# Patient Record
Sex: MALE | Race: WHITE
[De-identification: names, ages, dates, MRNs, and addresses within clinical notes are randomized per-mention and may not be internally consistent; named-entity substitution may affect disease eponyms.]

---
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
## 2021-06-26 ENCOUNTER — HOSPITAL ENCOUNTER (EMERGENCY)
Dept: HOSPITAL 38 - CC.ED | Age: 86
Discharge: HOME | End: 2021-06-26
Payer: MEDICARE

## 2021-06-26 DIAGNOSIS — Z96.649: ICD-10-CM

## 2021-06-26 DIAGNOSIS — I10: ICD-10-CM

## 2021-06-26 DIAGNOSIS — Z79.899: ICD-10-CM

## 2021-06-26 DIAGNOSIS — R10.11: Primary | ICD-10-CM

## 2021-06-26 DIAGNOSIS — Z79.82: ICD-10-CM

## 2021-06-26 LAB
CHLORIDE SERPL-SCNC: 97 MEQ/L (ref 98–106)
SODIUM SERPL-SCNC: 137 MEQ/L (ref 136–145)

## 2021-06-26 PROCEDURE — 99284 EMERGENCY DEPT VISIT MOD MDM: CPT

## 2021-06-26 PROCEDURE — 36415 COLL VENOUS BLD VENIPUNCTURE: CPT

## 2021-06-26 PROCEDURE — 74177 CT ABD & PELVIS W/CONTRAST: CPT

## 2021-06-26 PROCEDURE — 80053 COMPREHEN METABOLIC PANEL: CPT

## 2021-06-26 PROCEDURE — 81001 URINALYSIS AUTO W/SCOPE: CPT

## 2021-06-26 PROCEDURE — 85025 COMPLETE CBC W/AUTO DIFF WBC: CPT

## 2021-06-26 PROCEDURE — 86140 C-REACTIVE PROTEIN: CPT

## 2021-06-26 PROCEDURE — 82150 ASSAY OF AMYLASE: CPT

## 2021-06-26 PROCEDURE — 83690 ASSAY OF LIPASE: CPT

## 2021-06-26 PROCEDURE — 87086 URINE CULTURE/COLONY COUNT: CPT

## 2021-06-26 PROCEDURE — 87186 SC STD MICRODIL/AGAR DIL: CPT

## 2021-06-26 PROCEDURE — 87088 URINE BACTERIA CULTURE: CPT
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
## 2021-07-01 ENCOUNTER — HOSPITAL ENCOUNTER (EMERGENCY)
Dept: HOSPITAL 38 - CC.ED | Age: 86
Discharge: HOME | End: 2021-07-01
Payer: MEDICARE

## 2021-07-01 DIAGNOSIS — R10.11: Primary | ICD-10-CM

## 2021-07-01 DIAGNOSIS — I10: ICD-10-CM

## 2021-07-01 DIAGNOSIS — Z79.82: ICD-10-CM

## 2021-07-01 DIAGNOSIS — Z90.49: ICD-10-CM

## 2021-07-01 DIAGNOSIS — Z79.899: ICD-10-CM

## 2021-07-01 DIAGNOSIS — E78.00: ICD-10-CM

## 2021-07-01 LAB
CHLORIDE SERPL-SCNC: 100 MEQ/L (ref 98–106)
SODIUM SERPL-SCNC: 136 MEQ/L (ref 136–145)

## 2021-07-01 RX ADMIN — ALUMINUM HYDROXIDE, MAGNESIUM HYDROXIDE, AND SIMETHICONE ONE ML: 200; 200; 20 SUSPENSION ORAL at 22:19

## 2021-07-01 RX ADMIN — ALUMINUM HYDROXIDE, MAGNESIUM HYDROXIDE, AND SIMETHICONE ONE ML: 200; 200; 20 SUSPENSION ORAL at 21:15
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
## 2021-07-08 ENCOUNTER — HOSPITAL ENCOUNTER (EMERGENCY)
Dept: HOSPITAL 38 - CC.ED | Age: 86
Discharge: HOME | End: 2021-07-08
Payer: MEDICARE

## 2021-07-08 DIAGNOSIS — Z79.899: ICD-10-CM

## 2021-07-08 DIAGNOSIS — H00.014: Primary | ICD-10-CM

## 2021-07-08 DIAGNOSIS — E78.00: ICD-10-CM

## 2021-07-08 DIAGNOSIS — Z79.82: ICD-10-CM

## 2021-07-08 DIAGNOSIS — I10: ICD-10-CM

## 2021-07-08 DIAGNOSIS — K21.9: ICD-10-CM
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
## 2021-07-15 ENCOUNTER — HOSPITAL ENCOUNTER (OUTPATIENT)
Dept: HOSPITAL 38 - CC.SDS | Age: 86
End: 2021-07-15
Payer: MEDICARE

## 2021-07-15 DIAGNOSIS — E55.9: ICD-10-CM

## 2021-07-15 DIAGNOSIS — N40.1: ICD-10-CM

## 2021-07-15 DIAGNOSIS — R63.0: ICD-10-CM

## 2021-07-15 DIAGNOSIS — Z98.890: ICD-10-CM

## 2021-07-15 DIAGNOSIS — I10: ICD-10-CM

## 2021-07-15 DIAGNOSIS — R35.1: ICD-10-CM

## 2021-07-15 DIAGNOSIS — E78.5: ICD-10-CM

## 2021-07-15 DIAGNOSIS — Z79.899: ICD-10-CM

## 2021-07-15 DIAGNOSIS — Z90.49: ICD-10-CM

## 2021-07-15 DIAGNOSIS — R10.11: ICD-10-CM

## 2021-07-15 DIAGNOSIS — G89.29: Primary | ICD-10-CM

## 2021-07-15 DIAGNOSIS — Z79.82: ICD-10-CM

## 2021-07-15 DIAGNOSIS — R63.4: ICD-10-CM

## 2021-07-15 DIAGNOSIS — K21.9: ICD-10-CM

## 2021-07-15 NOTE — OR
DATE OF OPERATION:  07/15/2021

 

PREOPERATIVE DIAGNOSIS:  CHRONIC RIGHT UPPER QUADRANT ABDOMINAL PAIN, ANOREXIA,

AND WEIGHT LOSS.

 

POSTOPERATIVE DIAGNOSIS:  CHRONIC RIGHT UPPER QUADRANT ABDOMINAL PAIN, ANOREXIA,

AND WEIGHT LOSS.

 

SURGEON:  Bo Perez MD

 

PROCEDURE:  DIAGNOSTIC LAPAROSCOPY.

 

ANESTHESIA:  General.

 

ESTIMATED BLOOD LOSS:  None.

 

SPECIMEN:  None.

 

FINDINGS:  Normal laparoscopy.

 

RECOMMENDATIONS:  This patient's abdominal pain is not related to anything

surgical that I could see.  He has absolutely no adhesions intra-abdominally

despite having appendectomy, hernia repair, and gallbladder surgery.  I think he

did fairly well on his laxative, MiraLAX routine before he had his fractured

hip.  Would recommend this is more likely due to physiologic problem regarding

GI motility.

 

DESCRIPTION OF PROCEDURE:  After adequate preparation, a Elizabeth technique was

used to enter the infraumbilical area and inserted a 5 mm trocar for abdominal

insufflation.  Examination of the abdomen did not show any bowel injury.  There

was one other 5 mm trocar placed in the left upper quadrant.  Examination of the

abdomen is normal.  He does not have any adhesions or abnormalities in the area

in the right upper quadrant which he consistently points to below the rib cage.

The liver appears to be normal.  He has a few omental adhesions of the duodenum

to the gallbladder bed which is normal.  He has a hernia repair in the right

lower quadrant.  I could see the mesh and the tacks.  He does not have any

adhesions secondary to his appendectomy.  Examination of the left lower quadrant

and the left upper

quadrant are also normal.  The abdomen was desufflated.  The midline fascia was

closed with a 0 Vicryl and 4-0 Vicryl for the skin.

 

GRACE/DILSHAD

DD:  07/15/2021 09:43:57

DT:  07/15/2021 11:37:31

Job #:  [id number]

## 2021-09-27 ENCOUNTER — HOSPITAL ENCOUNTER (OUTPATIENT)
Dept: HOSPITAL 38 - CC.MS | Age: 86
Setting detail: OBSERVATION
LOS: 2 days | Discharge: HOME | End: 2021-09-29
Attending: FAMILY MEDICINE | Admitting: FAMILY MEDICINE
Payer: MEDICARE

## 2021-09-27 DIAGNOSIS — N40.1: ICD-10-CM

## 2021-09-27 DIAGNOSIS — I48.0: ICD-10-CM

## 2021-09-27 DIAGNOSIS — Z79.82: ICD-10-CM

## 2021-09-27 DIAGNOSIS — I10: ICD-10-CM

## 2021-09-27 DIAGNOSIS — E55.9: ICD-10-CM

## 2021-09-27 DIAGNOSIS — R60.0: Primary | ICD-10-CM

## 2021-09-27 DIAGNOSIS — R35.1: ICD-10-CM

## 2021-09-27 DIAGNOSIS — Z79.899: ICD-10-CM

## 2021-09-27 DIAGNOSIS — E78.5: ICD-10-CM

## 2021-09-27 DIAGNOSIS — K21.9: ICD-10-CM

## 2021-09-27 DIAGNOSIS — F41.9: ICD-10-CM

## 2021-09-27 LAB
CHLORIDE SERPL-SCNC: 101 MEQ/L (ref 98–106)
SODIUM SERPL-SCNC: 138 MEQ/L (ref 136–145)

## 2021-09-27 PROCEDURE — G0378 HOSPITAL OBSERVATION PER HR: HCPCS

## 2021-09-28 RX ADMIN — TAMSULOSIN HYDROCHLORIDE SCH MG: 0.4 CAPSULE ORAL at 08:24

## 2021-09-28 NOTE — PN
DATE:  09/28/2021

 

S:  Mr. Cabral is an 89-year-old who was admitted yesterday for possible DVTs.

Duplex looks fine.  Looks like he has a chronic nonobstructing clot in the

peroneal vein on that right leg.  His swelling is markedly down today.  There

are no signs of cellulitis.  His lab work was reviewed on admit.  Other than his

elevated D-dimer all looks negative.  He has been having some ongoing issues

with weight loss.  He has down almost 50 pounds in the last 6 months.

 

O:  VITAL SIGNS:  His vital signs otherwise look fine.

GENERAL:  On exam today, he pleasant and cooperative.

NECK:  Supple.  Veins are flat.

LUNGS:  Appear clear.

CARDIAC:  Tones are regular.

ABDOMEN:  Soft, but he does have some mid epigastric tenderness.  There are no

obvious mass.  There is no guarding, rebound, rigidity.  Bowel sounds are

normal.

EXTREMITIES:  The right lower extremity is down to about 1+ at the mid shin down

through the foot.  The left lower extremity has no edema.

 

ASSESSMENT:

1. RIGHT LOWER EXTREMITY EDEMA, LIKELY STATUS POST OPEN REDUCTION AND INTERNAL

    FIXATION OF THE RIGHT HIP FRACTURE.

2. PROFOUND WEIGHT LOSS.

3. EPIGASTRIC PAIN.

 

P:  Because of his weight loss, we are going to get CT scans of the chest,

abdomen, and pelvis.  I will do the chest with PE protocol just because his D-

dimer is so elevated, although he is not hypoxic.  We are going to get a CT

abdomen and pelvis as well.  He may need an EGD tomorrow morning prior to any

discharge plan.

 

MARGOTH/DILSHAD

DD:  09/28/2021 09:50:36

DT:  09/28/2021 10:24:01

Job #:  [id number]

## 2021-09-29 VITALS — SYSTOLIC BLOOD PRESSURE: 144 MMHG | DIASTOLIC BLOOD PRESSURE: 89 MMHG | HEART RATE: 78 BPM

## 2021-09-29 RX ADMIN — TAMSULOSIN HYDROCHLORIDE SCH MG: 0.4 CAPSULE ORAL at 07:51

## 2021-09-29 NOTE — DISCH
ADMISSION DIAGNOSES:

1. Right lower extremity edema.

2. Status post hip fracture.

3. Anxiety.

 

DISCHARGE DIAGNOSIS:

1. STATUS POST OPEN REDUCTION AND INTERNAL FIXATION RIGHT HIP FRACTURE WITH

    SECONDARY EDEMA.

2. PROFOUND WEIGHT LOSS.

3. CHRONIC DEPRESSION AND ANXIETY.

 

HISTORY:  The patient is an 89-year-old male who sees Dr. Singh for his chronic

health care.  He had been in [facility] at [facility] for an extended

period of time after ORIF and hip fracture.  He had lost significant weight

since that time.  He has been having some ongoing issues with swelling, but it

sounds like this has gotten markedly worse over the last week or so leading up

to his visiting me in my office where he had pretty significant edema from the

right lower extremity, mid shin down through the foot.  He was having a little

bit of discomfort, so we elected to put him in for observation and give him

appropriate cares as we could not immediately ultrasound him.  His D-dimer was

elevated over 9.

 

HOSPITAL COURSE:  The patient did fine.  We did put him on subcu Lovenox.  He

does have paroxysmal atrial fibrillation, but due to his fall risk it has been

elected to not have him on any chronic anticoagulation.  Duplex of the leg in

the morning after admission showed no signs of any acute DVT.  Because of the

patient's ongoing weight loss, we considered further workup including a CT of

his abdomen and pelvis.  He has had 2 of these in the last 4 months, both are

showing no identifiable etiology.  I believe he has also had an EGD in [place] by

their surgeon.  He did have a CT of his chest and because of the D-dimer we did

make it a CTA and this was negative as well.  Overall, he has done quite well.

His swelling is markedly down just from leg elevation and all in all he has had

unremarkable course in our facility.  I did have a long discussion with him on

the morning of discharge regarding his ongoing and significant issues with

anxiety.  Because of his weight loss and chronic anxiety, I think he would be an

excellent candidate for Remeron.  We are going to start him at 15 mg nightly and

have him have close followup with Dr. Singh in the next 2 weeks to monitor this.

No other med changes were made during his stay.

 

COMPLICATIONS:  During his stay were none.

 

CONSULTATIONS:  None.

 

PROCEDURES:

1. Duplex right lower extremity.

2. CTA of the chest.

 

DISPOSITION:  Discharged home.

 

MARGTOH/DILSHAD

DD:  09/29/2021 08:14:14

DT:  09/29/2021 08:47:54

Job #:  [id number]

## 2022-02-06 ENCOUNTER — HOSPITAL ENCOUNTER (EMERGENCY)
Dept: HOSPITAL 38 - CC.ED | Age: 87
Discharge: HOME | End: 2022-02-06
Payer: MEDICARE
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
## 2022-02-06 DIAGNOSIS — Z79.82: ICD-10-CM

## 2022-02-06 DIAGNOSIS — K21.9: ICD-10-CM

## 2022-02-06 DIAGNOSIS — E78.00: ICD-10-CM

## 2022-02-06 DIAGNOSIS — N40.0: ICD-10-CM

## 2022-02-06 DIAGNOSIS — S62.102A: Primary | ICD-10-CM

## 2022-02-06 DIAGNOSIS — I10: ICD-10-CM

## 2022-02-06 DIAGNOSIS — Z79.899: ICD-10-CM
